# Patient Record
Sex: MALE | Race: BLACK OR AFRICAN AMERICAN
[De-identification: names, ages, dates, MRNs, and addresses within clinical notes are randomized per-mention and may not be internally consistent; named-entity substitution may affect disease eponyms.]

---

## 2018-11-29 ENCOUNTER — HOSPITAL ENCOUNTER (INPATIENT)
Dept: HOSPITAL 72 - EMR | Age: 68
LOS: 2 days | Discharge: HOME | DRG: 145 | End: 2018-12-01
Payer: MEDICARE

## 2018-11-29 VITALS — DIASTOLIC BLOOD PRESSURE: 76 MMHG | SYSTOLIC BLOOD PRESSURE: 128 MMHG

## 2018-11-29 VITALS — SYSTOLIC BLOOD PRESSURE: 123 MMHG | DIASTOLIC BLOOD PRESSURE: 61 MMHG

## 2018-11-29 VITALS — DIASTOLIC BLOOD PRESSURE: 65 MMHG | SYSTOLIC BLOOD PRESSURE: 113 MMHG

## 2018-11-29 VITALS — HEIGHT: 73 IN | WEIGHT: 211 LBS | BODY MASS INDEX: 27.96 KG/M2

## 2018-11-29 VITALS — SYSTOLIC BLOOD PRESSURE: 114 MMHG | DIASTOLIC BLOOD PRESSURE: 69 MMHG

## 2018-11-29 VITALS — DIASTOLIC BLOOD PRESSURE: 61 MMHG | SYSTOLIC BLOOD PRESSURE: 128 MMHG

## 2018-11-29 VITALS — DIASTOLIC BLOOD PRESSURE: 57 MMHG | SYSTOLIC BLOOD PRESSURE: 124 MMHG

## 2018-11-29 DIAGNOSIS — E78.2: ICD-10-CM

## 2018-11-29 DIAGNOSIS — Z95.0: ICD-10-CM

## 2018-11-29 DIAGNOSIS — J20.9: ICD-10-CM

## 2018-11-29 DIAGNOSIS — I13.0: ICD-10-CM

## 2018-11-29 DIAGNOSIS — K21.9: ICD-10-CM

## 2018-11-29 DIAGNOSIS — N40.0: ICD-10-CM

## 2018-11-29 DIAGNOSIS — J20.8: Primary | ICD-10-CM

## 2018-11-29 DIAGNOSIS — Z88.8: ICD-10-CM

## 2018-11-29 DIAGNOSIS — I50.31: ICD-10-CM

## 2018-11-29 DIAGNOSIS — Z85.830: ICD-10-CM

## 2018-11-29 DIAGNOSIS — Z88.2: ICD-10-CM

## 2018-11-29 DIAGNOSIS — Z86.718: ICD-10-CM

## 2018-11-29 DIAGNOSIS — Z91.041: ICD-10-CM

## 2018-11-29 DIAGNOSIS — E27.1: ICD-10-CM

## 2018-11-29 DIAGNOSIS — E89.6: ICD-10-CM

## 2018-11-29 DIAGNOSIS — J45.909: ICD-10-CM

## 2018-11-29 DIAGNOSIS — B97.29: ICD-10-CM

## 2018-11-29 DIAGNOSIS — R31.9: ICD-10-CM

## 2018-11-29 DIAGNOSIS — N18.3: ICD-10-CM

## 2018-11-29 LAB
ADD MANUAL DIFF: NO
ALBUMIN SERPL-MCNC: 3.4 G/DL (ref 3.4–5)
ALBUMIN/GLOB SERPL: 1 {RATIO} (ref 1–2.7)
ALP SERPL-CCNC: 129 U/L (ref 46–116)
ALT SERPL-CCNC: 29 U/L (ref 12–78)
ANION GAP SERPL CALC-SCNC: 7 MMOL/L (ref 5–15)
APPEARANCE UR: CLEAR
APTT PPP: YELLOW S
AST SERPL-CCNC: 17 U/L (ref 15–37)
BASOPHILS NFR BLD AUTO: 1 % (ref 0–2)
BILIRUB SERPL-MCNC: 0.5 MG/DL (ref 0.2–1)
BUN SERPL-MCNC: 19 MG/DL (ref 7–18)
CALCIUM SERPL-MCNC: 9.1 MG/DL (ref 8.5–10.1)
CHLORIDE SERPL-SCNC: 109 MMOL/L (ref 98–107)
CHOLEST SERPL-MCNC: 217 MG/DL (ref ?–200)
CK MB SERPL-MCNC: 0.9 NG/ML (ref 0–3.6)
CK SERPL-CCNC: 105 U/L (ref 26–308)
CO2 SERPL-SCNC: 29 MMOL/L (ref 21–32)
CREAT SERPL-MCNC: 1.4 MG/DL (ref 0.55–1.3)
EOSINOPHIL NFR BLD AUTO: 4 % (ref 0–3)
ERYTHROCYTE [DISTWIDTH] IN BLOOD BY AUTOMATED COUNT: 10.6 % (ref 11.6–14.8)
GLOBULIN SER-MCNC: 3.4 G/DL
GLUCOSE UR STRIP-MCNC: NEGATIVE MG/DL
HCT VFR BLD CALC: 38.8 % (ref 42–52)
HDLC SERPL-MCNC: 38 MG/DL (ref 40–60)
HGB BLD-MCNC: 13.5 G/DL (ref 14.2–18)
KETONES UR QL STRIP: NEGATIVE
LEUKOCYTE ESTERASE UR QL STRIP: NEGATIVE
LYMPHOCYTES NFR BLD AUTO: 20.4 % (ref 20–45)
MCV RBC AUTO: 93 FL (ref 80–99)
MONOCYTES NFR BLD AUTO: 9 % (ref 1–10)
NEUTROPHILS NFR BLD AUTO: 65.5 % (ref 45–75)
NITRITE UR QL STRIP: NEGATIVE
PH UR STRIP: 5 [PH] (ref 4.5–8)
PLATELET # BLD: 165 K/UL (ref 150–450)
POTASSIUM SERPL-SCNC: 4.4 MMOL/L (ref 3.5–5.1)
PROT UR QL STRIP: NEGATIVE
RBC # BLD AUTO: 4.18 M/UL (ref 4.7–6.1)
SODIUM SERPL-SCNC: 145 MMOL/L (ref 136–145)
SP GR UR STRIP: 1.02 (ref 1–1.03)
TRIGL SERPL-MCNC: 157 MG/DL (ref 30–150)
UROBILINOGEN UR-MCNC: NORMAL MG/DL (ref 0–1)
WBC # BLD AUTO: 5.3 K/UL (ref 4.8–10.8)

## 2018-11-29 PROCEDURE — 82550 ASSAY OF CK (CPK): CPT

## 2018-11-29 PROCEDURE — 83880 ASSAY OF NATRIURETIC PEPTIDE: CPT

## 2018-11-29 PROCEDURE — 85025 COMPLETE CBC W/AUTO DIFF WBC: CPT

## 2018-11-29 PROCEDURE — 36415 COLL VENOUS BLD VENIPUNCTURE: CPT

## 2018-11-29 PROCEDURE — 87040 BLOOD CULTURE FOR BACTERIA: CPT

## 2018-11-29 PROCEDURE — 86710 INFLUENZA VIRUS ANTIBODY: CPT

## 2018-11-29 PROCEDURE — 84484 ASSAY OF TROPONIN QUANT: CPT

## 2018-11-29 PROCEDURE — 94664 DEMO&/EVAL PT USE INHALER: CPT

## 2018-11-29 PROCEDURE — 82553 CREATINE MB FRACTION: CPT

## 2018-11-29 PROCEDURE — 80061 LIPID PANEL: CPT

## 2018-11-29 PROCEDURE — 93005 ELECTROCARDIOGRAM TRACING: CPT

## 2018-11-29 PROCEDURE — 99285 EMERGENCY DEPT VISIT HI MDM: CPT

## 2018-11-29 PROCEDURE — 94640 AIRWAY INHALATION TREATMENT: CPT

## 2018-11-29 PROCEDURE — 83690 ASSAY OF LIPASE: CPT

## 2018-11-29 PROCEDURE — 96375 TX/PRO/DX INJ NEW DRUG ADDON: CPT

## 2018-11-29 PROCEDURE — 96376 TX/PRO/DX INJ SAME DRUG ADON: CPT

## 2018-11-29 PROCEDURE — 83605 ASSAY OF LACTIC ACID: CPT

## 2018-11-29 PROCEDURE — 71045 X-RAY EXAM CHEST 1 VIEW: CPT

## 2018-11-29 PROCEDURE — 80053 COMPREHEN METABOLIC PANEL: CPT

## 2018-11-29 PROCEDURE — 81003 URINALYSIS AUTO W/O SCOPE: CPT

## 2018-11-29 PROCEDURE — 84443 ASSAY THYROID STIM HORMONE: CPT

## 2018-11-29 PROCEDURE — 96365 THER/PROPH/DIAG IV INF INIT: CPT

## 2018-11-29 PROCEDURE — 93970 EXTREMITY STUDY: CPT

## 2018-11-29 RX ADMIN — SODIUM CHLORIDE SCH MLS/HR: 0.9 INJECTION INTRAVENOUS at 20:15

## 2018-11-29 RX ADMIN — AZITHROMYCIN DIHYDRATE SCH MLS/HR: 500 INJECTION, POWDER, LYOPHILIZED, FOR SOLUTION INTRAVENOUS at 21:10

## 2018-11-29 NOTE — DIAGNOSTIC IMAGING REPORT
Indication: Shortness of breath

 

Technique: One view of the chest

 

Comparison: none

 

Findings: There is some atelectasis at the left lung base. Lungs and pleural spaces

are otherwise clear. Heart size is normal. There is a left chest bifocal pacemaker.

 

Impression: Left basilar atelectasis. No acute process otherwise

## 2018-11-29 NOTE — EMERGENCY ROOM REPORT
History of Present Illness


General


Chief Complaint:  Dyspnea/Respdistress


Source:  Patient





Present Illness


HPI


Patient presents with complaints of cough and shortness of breath he reports 

that symptoms have been ongoing for the past 3-4 days


He has been on oral antibiotics he was seen by his urologist yesterday also his 

pulmonologist for the past few days





Patient reports he has a cardiologist as well


Denies any chest pain however he feels more short of breath laying flat


Increased cough and congestion


Denies any dysuria or frequency denies any neck pain or photophobia


Allergies:  


Coded Allergies:  


     HEPARIN (Verified  Allergy, Unknown, 11/29/18)


Uncoded Allergies:  


     CHICKEN (Allergy, Unknown, 11/29/18)





Patient History


Past Medical History:  see triage record


Pertinent Family History:  none


Reviewed Nursing Documentation:  PMH: Agreed; PSxH: Agreed





Nursing Documentation-PMH


Past Medical History:  No History, Except For


Hx Cardiac Problems:  Yes


Hx Pacemaker:  Yes


Hx Asthma:  Yes - Pneumonia


Hx COPD:  No


Hx Diabetes:  No


Hx Cancer:  No


Hx Gastrointestinal Problems:  No


Hx Dialysis:  No


History Of Psychiatric Problem:  No


Hx Neurological Problems:  No


Hx Cerebrovascular Accident:  No


Hx Seizures:  No





Review of Systems


All Other Systems:  negative except mentioned in HPI





Physical Exam





Vital Signs








  Date Time  Temp Pulse Resp B/P (MAP) Pulse Ox O2 Delivery O2 Flow Rate FiO2


 


11/29/18 11:21 98.1 98 14 157/77 97 Room Air  








Sp02 EP Interpretation:  reviewed, normal


General Appearance:  mild distress - Appears short of breath


Head:  normocephalic, atraumatic


Eyes:  bilateral eye PERRL, bilateral eye EOMI


ENT:  hearing grossly normal, normal pharynx, TMs + canals normal, uvula midline


Neck:  full range of motion, supple, no meningismus, no bony tend


Respiratory:  no respiratory distress, no retraction, no accessory muscle use, 

crackles - Both lower lobes


Cardiovascular #1:  normal peripheral pulses, regular rate, rhythm, no gallop, 

no JVD, no murmur


Gastrointestinal:  normal bowel sounds, non tender, soft, no mass, no 

organomegaly, non-distended, no guarding, no hernia, no pulsatile mass, no 

rebound


Genitourinary:  no CVA tenderness


Musculoskeletal:  normal inspection


Neurologic:  oriented x3, responsive, CNs III-XII nml as tested, motor strength/

tone normal, sensory intact


Psychiatric:  mood/affect normal


Skin:  other - Pitting edema bilaterally


Lymphatic:  normal inspection, no adenopathy





Medical Decision Making


Diagnostic Impression:  


 Primary Impression:  


 Dyspnea


ER Course


Patient is a fairly complex patient with multiple differential to consideration 

including but not limited to cardiac cardiopulmonary and vascular emergencies





Patient's blood work is fairly benign medical imaging does not show any obvious 

acute pathology





Patient has done better with diuretics and breathing treatment


Remained short of breath and dyspneic further inpatient interrogation 

investigation will be made





Consideration for pulmonary embolism is made patient has had fairly extensive 

recent workup


And this is defer to patient pulmonology





Labs








Test


  11/29/18


12:23


 


White Blood Count


  5.3 K/UL


(4.8-10.8)


 


Red Blood Count


  4.18 M/UL


(4.70-6.10)


 


Hemoglobin


  13.5 G/DL


(14.2-18.0)


 


Hematocrit


  38.8 %


(42.0-52.0)


 


Mean Corpuscular Volume 93 FL (80-99) 


 


Mean Corpuscular Hemoglobin


  32.2 PG


(27.0-31.0)


 


Mean Corpuscular Hemoglobin


Concent 34.7 G/DL


(32.0-36.0)


 


Red Cell Distribution Width


  10.6 %


(11.6-14.8)


 


Platelet Count


  165 K/UL


(150-450)


 


Mean Platelet Volume


  6.4 FL


(6.5-10.1)


 


Neutrophils (%) (Auto)


  65.5 %


(45.0-75.0)


 


Lymphocytes (%) (Auto)


  20.4 %


(20.0-45.0)


 


Monocytes (%) (Auto)


  9.0 %


(1.0-10.0)


 


Eosinophils (%) (Auto)


  4.0 %


(0.0-3.0)


 


Basophils (%) (Auto)


  1.0 %


(0.0-2.0)


 


Urine Color Yellow 


 


Urine Appearance Clear 


 


Urine pH 5 (4.5-8.0) 


 


Urine Specific Gravity


  1.025


(1.005-1.035)


 


Urine Protein


  Negative


(NEGATIVE)


 


Urine Glucose (UA)


  Negative


(NEGATIVE)


 


Urine Ketones


  Negative


(NEGATIVE)


 


Urine Blood 1+ (NEGATIVE) 


 


Urine Nitrite


  Negative


(NEGATIVE)


 


Urine Bilirubin


  Negative


(NEGATIVE)


 


Urine Urobilinogen


  Normal MG/DL


(0.0-1.0)


 


Urine Leukocyte Esterase


  Negative


(NEGATIVE)


 


Urine RBC


  0-2 /HPF (0 -


0)


 


Urine WBC 0 /HPF (0 - 0) 


 


Urine Squamous Epithelial


Cells None /LPF


(NONE/OCC)


 


Urine Bacteria


  None /HPF


(NONE)


 


Sodium Level


  145 MMOL/L


(136-145)


 


Potassium Level


  4.4 MMOL/L


(3.5-5.1)


 


Chloride Level


  109 MMOL/L


()


 


Carbon Dioxide Level


  29 MMOL/L


(21-32)


 


Anion Gap


  7 mmol/L


(5-15)


 


Blood Urea Nitrogen


  19 mg/dL


(7-18)


 


Creatinine


  1.4 MG/DL


(0.55-1.30)


 


Estimat Glomerular Filtration


Rate > 60 mL/min


(>60)


 


Glucose Level


  103 MG/DL


()


 


Lactic Acid Level


  1.90 mmol/L


(0.4-2.0)


 


Calcium Level


  9.1 MG/DL


(8.5-10.1)


 


Total Bilirubin


  0.5 MG/DL


(0.2-1.0)


 


Aspartate Amino Transf


(AST/SGOT) 17 U/L (15-37) 


 


 


Alanine Aminotransferase


(ALT/SGPT) 29 U/L (12-78) 


 


 


Alkaline Phosphatase


  129 U/L


()


 


Total Creatine Kinase


  105 U/L


()


 


Creatine Kinase MB


  0.9 NG/ML


(0.0-3.6)


 


Creatine Kinase MB Relative


Index 0.8 


 


 


Troponin I


  0.000 ng/mL


(0.000-0.056)


 


Pro-B-Type Natriuretic Peptide


  169 pg/mL


(0-125)


 


Total Protein


  6.8 G/DL


(6.4-8.2)


 


Albumin


  3.4 G/DL


(3.4-5.0)


 


Globulin 3.4 g/dL 


 


Albumin/Globulin Ratio 1.0 (1.0-2.7) 


 


Lipase


  81 U/L


()








Rhythm Strip Diag. Results


EP Interpretation:  yes


Rate:  77


Rhythm:  NSR, no PVC's, no ectopy





Chest X-Ray Diagnostic Results


Chest X-Ray Diagnostic Results :  


   Chest X-Ray Ordered:  Yes


   # of Views/Limited/Complete:  1 View


   Indication:  Chest Pain


   EP Interpretation:  Yes


   Interpretation:  no consolidation, no effusion, no pneumothorax


   Impression:  No acute disease


   Electronically Signed by:  Eneida Yepez DO





Last Vital Signs








  Date Time  Temp Pulse Resp B/P (MAP) Pulse Ox O2 Delivery O2 Flow Rate FiO2


 


11/29/18 11:21 98.1 98 14 157/77 97 Room Air  








Status:  improved


Disposition:  ADMITTED AS INPATIENT


Condition:  Serious











Eneida Yepez DO Nov 29, 2018 11:44

## 2018-11-29 NOTE — HISTORY AND PHYSICAL REPORT
DATE OF ADMISSION:  11/29/2018

HISTORY OF PRESENT ILLNESS:  The patient is a very pleasant 68-year-old

gentleman, who presented to the emergency room here with complaints of

chest congestion, rhinorrhea, and temperature to 102 at home with chills.

No nausea or vomiting.  Occasional cough.  The patient was initially seen

as an outpatient by Dr. Salomon, who started him on doxycycline for

bronchitis and his chest x-ray was stable, but since then, he had

increased sensitivity including his congestion, cough, wheezing, shortness

of breath, and thus decided to come to the emergency room.  He was

initially gone through [facility] emergency room, but left AMA three times and

presented now to [place] for further evaluation and treatment.  The

patient has not followed up with me in the office recently, has been

apparently noncompliant with any of his medications or his diet.



PAST MEDICAL HISTORY:  Includes history of cough, history of neurogenic

syncope, history of cardiac pacemaker, history of hypertension, history of

chronic low back pain, history of prior DVT, status post six months of

anticoagulation, history of chest pain with normal workup in the past,

history of orthostatic hypotension, history of Larue's disease, history

of spinal stenosis, status post surgery, history of pseudogout, history of

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

the past, status post adrenalectomy in 2004, history of hernia repair,

history of reactive airway disease, history of bronchitis in 2015 after

human metapneumovirus infection.  The patient has had recurrent bouts of

bronchitis since then, history of knee cancer in 1960, status post chemo,

history of hyperlipidemia, history of RSV pneumonitis, and history of

bronchospasms.  History of _____ SVT ablation, history of greater

saphenous vein radiofrequency ablation by  _____.



ALLERGIES:  Allergic to heparin analogues, iodine, Bactrim, and

chicken-derived products.



MEDICATIONS:  Please see reconciled medications list.



SOCIAL HISTORY:  Does not smoke.  Does not drink any alcohol.  Does not use

any drugs.  He used to smoke a pipe, quit over 20 years ago.



FAMILY HISTORY:  Noncontributory.



REVIEW OF SYSTEMS:  A 12-point review of systems reviewed.  Negative except

for above.  He denies any chest pain.  He admits to increased lower

extremity swelling recently.



PHYSICAL EXAMINATION:

GENERAL:  He is a well developed, well nourished, currently in no apparent

distress.

VITAL SIGNS:  Blood pressure is 128/61, pulse 78, respirations 14,

temperature 98.1, and saturations 97% room air.

HEENT:  Head is normocephalic and atraumatic.  Pupils are equal and

reactive to light.  Extraocular muscles are intact.  Eyes are anicteric.

 

NECK:  Supple.  No JVP.  No bruits.

LUNGS:  He does have end-expiratory wheezes and coarse breath sounds.

HEART:  Regular rate and rhythm.

ABDOMEN:  Soft.  Positive bowel sounds.  Nondistended.  Nontender.

 

EXTREMITIES:  No clubbing or cyanosis.  Bilateral edema, right greater than

left about 2+.



LABORATORY AND DIAGNOSTIC DATA:  Labs revealed a white count of 5.3,

hemoglobin 13.5, hematocrit 38.8, platelet count 165.  Sodium 145,

potassium 4.4, chloride 109, BUN of 19, creatinine 1.4.  Lactic acid 1.9.

Troponin is 0.  ProBNP is 169.  Chest x-ray, there is left basilar

atelectasis.  No acute process, otherwise.  EKG noted.



ASSESSMENT AND PLAN:  The patient is a 68-year-old gentleman, presents with

shortness of breath, congestion, wheezing despite taking oral doxycycline.

The patient will be admitted and Pulmonary consultation requested from

Dr. Salomon.  The patient will be started on antibiotics, Zithromax and

Rocephin.  He will be given respiratory treatments with DuoNeb.  We will

give him a low-dose steroids for now.  We will monitor his laboratories.

Consider diuretics.  We will also obtain an influenza panel on him and

viral panel.  The patient should be on DVT and ulcer prophylaxis.









  ______________________________________________

  Waldo Sanders M.D. DR:  ELENA

D:  11/29/2018 22:12

T:  11/30/2018 11:51

JOB#:  [id number]

CC:

## 2018-11-29 NOTE — CONSULTATION
Consult Note


Consult Note


PCCM CONSULTATION


11/29/2018





REFERRING PHYSICIAN: Waldo Sanders MD ~





REASON FOR CONSULTATION: SOB





HPI:~Calos Griffithis a 68 year old~malewell known to me with multiple 

medical problems as noted below who saw me in the office 2 days ago with a 6 lb 

weight gain and 2 weeks of congestion, rhinorrhea, not coughing but feels his 

lungs are full, T 102 @ home + chills, no NVDC, no abd pain or urinary 

complaints.  He was AFVSS in my office, CXR was stable, I started him on Doxy 

for bronchitis.  Since then he has had increased cough, congestion, SOB and 

wheezing. No F/C/CP.  He went to [facility] ED and left AMA 3 times, I convinced him 

to come to the ER for further evaluation and treatment. He has a h/o medication 

non-compliance, dietary and diuretic non-compliance as well. 














  


 


Past Medical History:  


 


Diagnosis Date 


 


 Ananth disease (HCC) 


 


 History of cancer 1970


 


 bone cancer in knees - had chemo ; per pt, has been in remission since 


 


 History of deep venous thrombosis 


 


 right leg 


 


 Hypercholesterolemia 


 


 Hypertension 


 


 per pt, low BP 


 


 Pacemaker 


 


 Pneumonia 


 


 Syncope 


 


 no findings for cause 














   


 


Past Surgical History:   


 


Procedure Laterality Date 


 


 HX CVIC-EPS N/A 10/29/2015


 


 Surgeon: Heidi Jimenez MD; ~Location: MAIN CARDIAC CATH LAB  


 


 HX CVIC-SVT ABLATION N/A 10/29/2015


 


 eps/svt; ~Surgeon: Heidi Jimenez MD; ~Location: MAIN CARDIAC CATH LAB  


 


 HX GI-COLONOSCOPY N/A 9/8/2015


 


 Surgeon: Kev Toibas MD; ~Location: MAIN GI LAB  


 


 HX GI-ESOPHAGOGASTRODUODENOSCOPY N/A 9/8/2015


 


 biopsy; ~Surgeon: Kev Tobias MD; ~Location: MAIN GI LAB  


 


 HX LUMBAR LAMINECTOMY  9/2/14


 


 HX LUMBAR LAMINECTOMY N/A 9/2/2014


 


 L3-L5; ~Surgeon: Joe Markham MD; ~Location: MAIN OR  


 


 HX NON-ANESTHESIA FOR IMAGING  N/A 9/24/2015


 


 Surgeon: Jostin Mcfarlane MD; ~Location: MAIN IMAGING  


 


 HX PACEMAKER PLACEMENT Left 07/25/2017


 


 Pacemaker [employer] Model L311 Serial; [employer] Lead [id number]; 

Pacesetter Lead [id number]  


 


 HX VEIN RADIOFREQUENCY ABLATION Right 6/5/2018


 


 GREAT SAPHENOUS VEIN RADIOFREQUENCY ABLATION; ~Surgeon: Eneida Rowland MD; ~

Location: MAIN OR  


 


 [place] LAP,ADRENALECTOMY  May 2004


 


 Dr Nissen~  








Allergies:~~Heparin analogues; Iodine [iodinated contrast- oral and iv dye]; 

Prednisone; Sulfamethoxazole-trimethoprim; Chicken derived; Heparin; and Iodine


MEDS: None








Social History














   


 


Social History   


 


 Marital status: Single 


 


  Spouse name: N/A


 


 Number of children: N/A 


 


 Years of education: N/A 














 


 


Occupational History 


 


 Not on file.














    


 


Social History Main Topics    


 


 Smoking status: Former Smoker  


 


  Start date: 1/1/1972 


 


  Quit date: 1/1/1986 


 


 Smokeless tobacco: Former User  


 


   Comment: Smoked a pipe; quit smoking over 20 years ago 


 


 Alcohol use No  


 


 Drug use: No  


 


 Sexual activity: No  














   


 


Family History   


 


Problem Relation Age of Onset 


 


 No Significant Medical Problems Mother 


 


 No Significant Medical Problems Father 








ROS: Negative other than HPI


PE: 


Vital Sign - Last 24 Hours








 11/29/18 11/29/18 11/29/18





 11:21 12:46 12:46


 


Temp 98.1 98.1 


 


Pulse 98 78 98


 


Resp 14 14 14


 


B/P (MAP) 157/77 128/61 


 


Pulse Ox 97 97 


 


O2 Delivery Room Air Room Air Room Air











NAD, AAOX3


NC/AT, OPC c MMM


Supple s LAD of JVD


Scattered coarse with EEW


RRR 


S/NT/ND c NABS


R > L 2+ HELENA


Assessment/Plan


ASSESSMENT: 





1.   URI/BRONCHITIS ---> bronchospasm


2.   S/P RLE venous ablation for chronic venous insufficiency


3.   Chronic bronchospasm and reactive airway disease 


4.   Prolonged post viral course in the past.


5.   History of recanalized deep vein thrombosis in the right, status post 6 

months of anticoagulation with no evidence of deep vein thrombosis currently. 


6.   Gastroesophageal reflux disease.


7.   Congestive heart failure with diastolic dysfunction ~


8.   Ananth disease.


9.   History of adrenalectomy.


10.   History of neurogenic syncope.


11.   History of pacemaker.


12.   Hypertension.


13.   Hyperlipidemia.


14.   L3 lesion, Schmorl node





PLAN: 





ED evaluation


Labs, CXR


May need some diuretics


Start CAP coverage


Advair 


HHN's


Cautious steroids given h/o AMS/psychossi


Monitor volumes and renal function


Likely admission


DVT PX: SCD (has Hep allergy)


FC











Darshan Salomon MD Nov 29, 2018 13:55

## 2018-11-30 VITALS — DIASTOLIC BLOOD PRESSURE: 46 MMHG | SYSTOLIC BLOOD PRESSURE: 106 MMHG

## 2018-11-30 VITALS — DIASTOLIC BLOOD PRESSURE: 69 MMHG | SYSTOLIC BLOOD PRESSURE: 109 MMHG

## 2018-11-30 VITALS — SYSTOLIC BLOOD PRESSURE: 110 MMHG | DIASTOLIC BLOOD PRESSURE: 70 MMHG

## 2018-11-30 VITALS — SYSTOLIC BLOOD PRESSURE: 112 MMHG | DIASTOLIC BLOOD PRESSURE: 21 MMHG

## 2018-11-30 VITALS — SYSTOLIC BLOOD PRESSURE: 96 MMHG | DIASTOLIC BLOOD PRESSURE: 57 MMHG

## 2018-11-30 VITALS — DIASTOLIC BLOOD PRESSURE: 64 MMHG | SYSTOLIC BLOOD PRESSURE: 106 MMHG

## 2018-11-30 LAB
ALBUMIN SERPL-MCNC: 3.3 G/DL (ref 3.4–5)
ALBUMIN/GLOB SERPL: 0.9 {RATIO} (ref 1–2.7)
ALP SERPL-CCNC: 131 U/L (ref 46–116)
ALT SERPL-CCNC: 25 U/L (ref 12–78)
ANION GAP SERPL CALC-SCNC: 8 MMOL/L (ref 5–15)
AST SERPL-CCNC: 26 U/L (ref 15–37)
BILIRUB SERPL-MCNC: 0.6 MG/DL (ref 0.2–1)
BUN SERPL-MCNC: 31 MG/DL (ref 7–18)
CALCIUM SERPL-MCNC: 9 MG/DL (ref 8.5–10.1)
CHLORIDE SERPL-SCNC: 106 MMOL/L (ref 98–107)
CO2 SERPL-SCNC: 26 MMOL/L (ref 21–32)
CREAT SERPL-MCNC: 1.5 MG/DL (ref 0.55–1.3)
GLOBULIN SER-MCNC: 3.7 G/DL
POTASSIUM SERPL-SCNC: 4.8 MMOL/L (ref 3.5–5.1)
SODIUM SERPL-SCNC: 139 MMOL/L (ref 136–145)

## 2018-11-30 RX ADMIN — SODIUM CHLORIDE SCH MLS/HR: 0.9 INJECTION INTRAVENOUS at 19:30

## 2018-11-30 RX ADMIN — FLUTICASONE PROPIONATE AND SALMETEROL SCH PUFFS: 50; 250 POWDER RESPIRATORY (INHALATION) at 19:48

## 2018-11-30 RX ADMIN — AZITHROMYCIN DIHYDRATE SCH MLS/HR: 500 INJECTION, POWDER, LYOPHILIZED, FOR SOLUTION INTRAVENOUS at 20:00

## 2018-11-30 RX ADMIN — IPRATROPIUM BROMIDE AND ALBUTEROL SULFATE SCH ML: .5; 3 SOLUTION RESPIRATORY (INHALATION) at 23:41

## 2018-11-30 RX ADMIN — IPRATROPIUM BROMIDE AND ALBUTEROL SULFATE SCH ML: .5; 3 SOLUTION RESPIRATORY (INHALATION) at 19:48

## 2018-11-30 RX ADMIN — GUAIFENESIN SCH MG: 600 TABLET, EXTENDED RELEASE ORAL at 17:11

## 2018-11-30 NOTE — DIAGNOSTIC IMAGING REPORT
--------------- APPROVED REPORT --------------





CPT Code: 55405



Present Symptoms

Comments: BILATERAL LEGS PAIN.





BILATERAL: Imaging reveals a patent deep venous system bilaterally. There is no evidence 

of thrombus within the femoral, popliteal or tibial segments. The greater saphenous veins 

are also within normal limits. Doppler indicates normal spontaneous flow within these 

segments.

## 2018-11-30 NOTE — GENERAL PROGRESS NOTE
Assessment/Plan


Assessment/Plan


dyspnea


bronchitis


LE edema 








abx


check cultures


check viral panels


pulmonary hygine


le edema improved


check dopplers


dvt and ulcer prophylaxis





Subjective


Neurologic/Psychiatric:  Reports: paresthesia


Allergies:  


Coded Allergies:  


     HEPARIN (Verified  Allergy, Unknown, 11/29/18)


Uncoded Allergies:  


     CHICKEN (Allergy, Unknown, 11/29/18)


Subjective


feels better decreased sob decreased cough no cp





Objective





Last 24 Hour Vital Signs








  Date Time  Temp Pulse Resp B/P (MAP) Pulse Ox O2 Delivery O2 Flow Rate FiO2


 


11/30/18 19:50      Room Air  21


 


11/30/18 19:50        21


 


11/30/18 19:50      Room Air  21


 


11/30/18 19:50        21


 


11/30/18 19:50  77 16   Room Air  21


 


11/30/18 16:29 98.0 73 20 106/64 (78) 95   





  73      


 


11/30/18 16:00  71      


 


11/30/18 12:00  70      


 


11/30/18 12:00 98.3 82 20 112/21 (51) 99   





  82      


 


11/30/18 09:00      Room Air  


 


11/30/18 08:20  82 18   Room Air  21


 


11/30/18 08:00  79      


 


11/30/18 08:00 98.4 76 20 96/57 (70) 96   





  79      


 


11/30/18 04:00  70      


 


11/30/18 04:00 98.0 79 19 109/69 (82) 97   





  79      


 


11/30/18 00:00 97.9 76 18 110/70 (83) 97   





  76      


 


11/30/18 00:00  74      

















Intake and Output  


 


 11/29/18 11/30/18





 19:00 07:00


 


Intake Total 0 ml 


 


Balance 0 ml 


 


  


 


Intake Oral 0 ml 


 


Other 0 ml 








Laboratory Tests


11/30/18 07:00: 


Sodium Level 139, Potassium Level 4.8, Chloride Level 106, Carbon Dioxide Level 

26, Anion Gap 8, Blood Urea Nitrogen 31H, Creatinine 1.5H, Estimat Glomerular 

Filtration Rate 56.1, Glucose Level 122H, Calcium Level 9.0, Total Bilirubin 0.6

, Aspartate Amino Transf (AST/SGOT) 26, Alanine Aminotransferase (ALT/SGPT) 25, 

Alkaline Phosphatase 131H, Pro-B-Type Natriuretic Peptide 110, Total Protein 7.0

, Albumin 3.3L, Globulin 3.7, Albumin/Globulin Ratio 0.9L


Height (Feet):  6


Height (Inches):  1.00


Weight (Pounds):  211


General Appearance:  WD/WN, no apparent distress


Neck:  supple


Cardiovascular:  regular rhythm


Respiratory/Chest:  lungs clear


Abdomen:  soft











Waldo Sanders MD Nov 30, 2018 23:10

## 2018-11-30 NOTE — PULMONOLOGY PROGRESS NOTE
Assessment/Plan


Problems:  


(1) Reactive airway disease


(2) Bronchospasm with bronchitis, acute


(3) Coronavirus infection


Assessment/Plan


ASSESSMENT: 





1.   Coronavirus URI/BRONCHITIS ---> bronchospasm


2.   S/P RLE venous ablation for chronic venous insufficiency


3.   Chronic bronchospasm and reactive airway disease 


4.   Prolonged post viral course in the past.


5.   History of recanalized deep vein thrombosis in the right, status post 6 

months of anticoagulation with no evidence of deep vein thrombosis currently. 


6.   Gastroesophageal reflux disease.


7.   Congestive heart failure with diastolic dysfunction ~


8.   Naturita disease.


9.   History of adrenalectomy.


10.   History of neurogenic syncope.


11.   History of pacemaker.


12.   Hypertension.


13.   Hyperlipidemia.


14.   L3 lesion, Schmorl node





PLAN: 





Optimize pulmonary hygiene/mobilize as tolerated


PRN O2


Advair 250/50 (Auto sub for Symbicort)


RTC and PRN HHN's


Hold off steroids given clinical improvement


CTx/Azithro (D2), F/U CX's


Monitor volumes and renal function, PRN Lasix


DVT PX: SCD (has Hep allergy)


FC





Subjective


Allergies:  


Coded Allergies:  


     HEPARIN (Verified  Allergy, Unknown, 11/29/18)


Uncoded Allergies:  


     CHICKEN (Allergy, Unknown, 11/29/18)


Subjective


AFVSS, stable on RA, VRPM from my office --> CORONAVIRUS


Less SOB, less cough, less wheezing, edema is better, no F/C, no CP


Feels ~ 80% improved





Objective





Last 24 Hour Vital Signs








  Date Time  Temp Pulse Resp B/P (MAP) Pulse Ox O2 Delivery O2 Flow Rate FiO2


 


11/30/18 12:00  70      


 


11/30/18 12:00 98.3 82 20 112/21 (51) 99   





  82      


 


11/30/18 09:00      Room Air  


 


11/30/18 08:20  82 18   Room Air  21


 


11/30/18 08:00  79      


 


11/30/18 08:00 98.4 76 20 96/57 (70) 96   





  79      


 


11/30/18 04:00  70      


 


11/30/18 04:00 98.0 79 19 109/69 (82) 97   





  79      


 


11/30/18 00:00 97.9 76 18 110/70 (83) 97   





  76      


 


11/30/18 00:00  74      


 


11/29/18 22:15  79 20   Room Air  21


 


11/29/18 21:00      Room Air  


 


11/29/18 20:00 98.4 78 18 114/69 (84) 97   





  78      


 


11/29/18 20:00  75      


 


11/29/18 18:00 97.3 65 21 113/65 (81) 96   





  65      


 


11/29/18 16:17 98.1 78 16 123/61 99 Room Air  





  78      

















Intake and Output  


 


 11/29/18 11/30/18





 18:59 06:59


 


Intake Total 0 ml 


 


Balance 0 ml 


 


  


 


Intake Oral 0 ml 


 


Other 0 ml 








General Appearance:  WD/WN, no acute distress


HEENT:  normocephalic, atraumatic, anicteric, mucous membranes moist


Respiratory/Chest:  chest wall non-tender, expiratory wheezing


Cardiovascular:  normal peripheral pulses, normal rate, regular rhythm


Abdomen:  normal bowel sounds, soft, non tender, no organomegaly, non distended

, no mass


Extremities:  no cyanosis, no clubbing, no edema





Microbiology








 Date/Time


Source Procedure


Growth Status


 


 


 11/29/18 13:35


Nasal Nares Influenza Types A,B Antigen (DEE) - Final Complete








Laboratory Tests


11/30/18 07:00: 


Sodium Level 139, Potassium Level 4.8, Chloride Level 106, Carbon Dioxide Level 

26, Anion Gap 8, Blood Urea Nitrogen 31H, Creatinine 1.5H, Estimat Glomerular 

Filtration Rate 56.1, Glucose Level 122H, Calcium Level 9.0, Total Bilirubin 0.6

, Aspartate Amino Transf (AST/SGOT) 26, Alanine Aminotransferase (ALT/SGPT) 25, 

Alkaline Phosphatase 131H, Pro-B-Type Natriuretic Peptide 110, Total Protein 7.0

, Albumin 3.3L, Globulin 3.7, Albumin/Globulin Ratio 0.9L





Current Medications








 Medications


  (Trade)  Dose


 Ordered  Sig/Vandana


 Route


 PRN Reason  Start Time


 Stop Time Status Last Admin


Dose Admin


 


 Acetaminophen


  (Tylenol)  650 mg  Q6H  PRN


 ORAL


 Mild Pain/Temp > 100.5  11/29/18 18:00


 12/29/18 17:59   


 


 


 Albuterol/


 Ipratropium


  (Albuterol/


 Ipratropium)  3 ml  Q4H  PRN


 HHN


 Shortness of Breath  11/29/18 18:00


 12/4/18 17:59   


 


 


 Azithromycin 500


 mg/Dextrose  275 ml @ 


 275 mls/hr  Q24HRS


 IV


   11/29/18 20:00


 12/5/18 20:59  11/29/18 21:10


 


 


 Ceftriaxone


 Sodium 1 gm/


 Dextrose  55 ml @ 


 110 mls/hr  Q24H


 IVPB


   11/29/18 19:30


 12/6/18 19:29  11/29/18 20:15


 


 


 Guaifenesin/


 Codeine Phosphate


  (Robitussin with


 codeine)  5 ml  Q6H  PRN


 ORAL


 For Cough  11/29/18 18:00


 12/29/18 17:59   


 

















Darshan Salomon MD Nov 30, 2018 15:54

## 2018-11-30 NOTE — CONSULTATION
DATE OF CONSULTATION:  11/30/2018

NEPHROLOGY CONSULTATION



CONSULTING PHYSICIAN:  Thom Lemus M.D.



REFERRING PHYSICIAN:  Darshan Salomon M.D.



REASON FOR CONSULTATION:  Increasing shortness of breath.



HISTORY OF PRESENT ILLNESS:  This is a very pleasant 68-year-old male who

has some underlying diastolic cardiac dysfunction and also some underlying

CKD stage 3.  He has been having intermittent episodes of shortness of

breath, which is alleviated by loop diuretics.  He is supposed to take

Bumex when his leg is swelling up and he is getting short of breath.

Apparently, he has seen Dr. Salomon in the office about a couple of days

ago and he gained about 8 pounds.  He denied any symptoms at that time and

he did not take his Bumex, however, he became more and more short of

breath and presented to the emergency room of [facility].  In

the emergency room, he was given 40 mg of IV Lasix and seems like he

urinated pretty well and he is feeling much, much better now.  His serum

creatinine being in the range of 1.3, has gone up to about 1.5 mg/dL.



PAST MEDICAL HISTORY:  Significant for recurrent lower extremity edema,

which is probably a combination of CKD stage 3 and diastolic heart

failure; noncompliance with salt; history of metapneumovirus with

recurrent bronchospasm in the past, which has resolved; history of lumbar

stenosis; history of deep venous thrombosis; history of [place] disease in

the past; history of pheochromocytoma, status post adenoidectomy in 2004;

status post pacemaker implantation in 2003 for sick sinus syndrome;

history of bone cancer 45 years ago, status post radiation; previous

recurrent neurogenic syncope; previous pneumonia; also has had kidney

stone with obstructive uropathy and urinary tract infection; Baker cyst in

the popliteal fossa; history of abdominal hernia; hyperlipidemia; history

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

unknown significance; and history of fatty liver.



PAST SURGICAL HISTORY:  Status post unilateral adenoidectomy.  He is status

post recent surgery for varicose veins of right lower extremity.



SOCIAL HISTORY:  Does not smoke and does not drink alcohol abusively.  He

is not  and does not have any children at this point.



FAMILY HISTORY:  Noncontributory.



MEDICATIONS:  Includes albuterol one to two puffs q.4 h., Symbicort two

puffs b.i.d., Bumex 1 mg p.o. every morning as needed, Proscar 5 mg p.o.

daily, Myrbetriq 50 mg p.o. daily, Protonix 40 mg p.o. daily, Pravachol 80

mg p.o. nightly, and Flomax 0.4 mg p.o. daily.



ALLERGIES:  Heparin analogues, which causes hives; oral contrast, which

causes swelling; sulfa causes hives, chicken _____ causes rash; heparin

causes itching, and iodine causes rash.



REVIEW OF SYSTEMS:  GENERAL:  He is feeling much much better now.  He has

had some lack of appetite.  CARDIOVASCULAR:  Denies any chest pain.  He

did not have any orthopnea.  No increasing leg edema.  RESPIRATORY:  He

has been having some cough and some wheezing apparently.

GASTROINTESTINAL:  Denies any nausea, vomiting, diarrhea, melena, or

hematochezia.  SKIN:  Denies any rash or photosensitivity.

MUSCULOSKELETAL:  Denies any arthralgia or myalgia.  ENDOCRINOLOGY:  No

history of diabetes mellitus.  URINARY:  Denies any urinary foaminess,

hematuria, or dysuria.  HEMATOLOGICAL:  Denies any easy bruising or easy

bleeding.  NEUROLOGIC:  Denies any paresthesia, muscle weakness, diplopia,

or seizure.



PHYSICAL EXAMINATION:

GENERAL:  Moderately obese gentleman, who seemed to be in much acute

distress, lying down in bed.

VITAL SIGNS:  Blood pressure is 112/50, pulse of 82, respirations 20, and

temperature 98.3.

HEENT:  Head is atraumatic.  Eyes, pupils reactive to light.  No evidence

of papilledema.  Ears, canals are clear.  Tympanic membranes are intact.

Nose, nares are patent without any nasal discharge.  Throat without

inflammation or exudate.

NECK:  Supple.  Jugular venous distention is within normal limits.  No

cervical adenopathy.  No thyromegaly.

HEART:  Regular rhythm.  No gallop.

LUNGS:  There are few expiratory wheezes bilaterally.

ABDOMEN:  Supple.  Bowel sounds positive.  No hepatosplenomegaly.

EXTREMITIES:  Lower extremity shows no significant pedal edema.

NEUROLOGICAL:  Cranial nerves are intact.  There is no focal neurological

deficit present.



LABORATORY DATA:  Sodium 139, potassium 4.8, chloride is 106, carbon

dioxide 26, BUN is 31, and creatinine is 1.5.  WBC 5.3, hemoglobin 13.5,

hematocrit 38.8, and platelets of 165,000.



IMPRESSION:

1. He does not seem to be in heart failure at this point.

2. I am not convinced that he is in heart failure clinically.

3. He might have some asthmatic bronchitis due to a viral illness

possibly.

4. Obesity.

5. Previous renal colic with calcium oxalate stone.



PLAN:  I would not recommend to continue with the diuretic at this point.

Continue with the current measures.









  ______________________________________________

  Thom Lemus M.D.





DR:  RIVERA

D:  11/30/2018 15:26

T:  11/30/2018 21:32

JOB#:  [id number]

CC:

## 2018-11-30 NOTE — CARDIOLOGY PROGRESS NOTE
Assessment/Plan


Assessment/Plan


[id number]





Objective





Last 24 Hour Vital Signs








  Date Time  Temp Pulse Resp B/P (MAP) Pulse Ox O2 Delivery O2 Flow Rate FiO2


 


11/30/18 16:29 98.0 73 20 106/64 (78) 95   





  73      


 


11/30/18 16:00  71      


 


11/30/18 12:00  70      


 


11/30/18 12:00 98.3 82 20 112/21 (51) 99   





  82      


 


11/30/18 09:00      Room Air  


 


11/30/18 08:20  82 18   Room Air  21


 


11/30/18 08:00  79      


 


11/30/18 08:00 98.4 76 20 96/57 (70) 96   





  79      


 


11/30/18 04:00  70      


 


11/30/18 04:00 98.0 79 19 109/69 (82) 97   





  79      


 


11/30/18 00:00 97.9 76 18 110/70 (83) 97   





  76      


 


11/30/18 00:00  74      


 


11/29/18 22:15  79 20   Room Air  21


 


11/29/18 21:00      Room Air  


 


11/29/18 20:00 98.4 78 18 114/69 (84) 97   





  78      


 


11/29/18 20:00  75      

















Intake and Output  


 


 11/29/18 11/30/18





 18:59 06:59


 


Intake Total 0 ml 


 


Balance 0 ml 


 


  


 


Intake Oral 0 ml 


 


Other 0 ml 











Laboratory Tests








Test


  11/30/18


07:00


 


Sodium Level


  139 MMOL/L


(136-145)


 


Potassium Level


  4.8 MMOL/L


(3.5-5.1)


 


Chloride Level


  106 MMOL/L


()


 


Carbon Dioxide Level


  26 MMOL/L


(21-32)


 


Anion Gap


  8 mmol/L


(5-15)


 


Blood Urea Nitrogen


  31 mg/dL


(7-18)  H


 


Creatinine


  1.5 MG/DL


(0.55-1.30)  H


 


Estimat Glomerular Filtration


Rate 56.1 mL/min


(>60)


 


Glucose Level


  122 MG/DL


()  H


 


Calcium Level


  9.0 MG/DL


(8.5-10.1)


 


Total Bilirubin


  0.6 MG/DL


(0.2-1.0)


 


Aspartate Amino Transf


(AST/SGOT) 26 U/L (15-37)


 


 


Alanine Aminotransferase


(ALT/SGPT) 25 U/L (12-78)


 


 


Alkaline Phosphatase


  131 U/L


()  H


 


Pro-B-Type Natriuretic Peptide


  110 pg/mL


(0-125)


 


Total Protein


  7.0 G/DL


(6.4-8.2)


 


Albumin


  3.3 G/DL


(3.4-5.0)  L


 


Globulin 3.7 g/dL  


 


Albumin/Globulin Ratio


  0.9 (1.0-2.7)


L











Microbiology








 Date/Time


Source Procedure


Growth Status


 


 


 11/29/18 13:35


Nasal Nares Influenza Types A,B Antigen (DEE) - Final Complete

















Yong Jarquin MD Nov 30, 2018 19:44

## 2018-12-01 VITALS — DIASTOLIC BLOOD PRESSURE: 54 MMHG | SYSTOLIC BLOOD PRESSURE: 96 MMHG

## 2018-12-01 VITALS — SYSTOLIC BLOOD PRESSURE: 106 MMHG | DIASTOLIC BLOOD PRESSURE: 65 MMHG

## 2018-12-01 VITALS — SYSTOLIC BLOOD PRESSURE: 124 MMHG | DIASTOLIC BLOOD PRESSURE: 67 MMHG

## 2018-12-01 VITALS — SYSTOLIC BLOOD PRESSURE: 94 MMHG | DIASTOLIC BLOOD PRESSURE: 55 MMHG

## 2018-12-01 RX ADMIN — IPRATROPIUM BROMIDE AND ALBUTEROL SULFATE SCH ML: .5; 3 SOLUTION RESPIRATORY (INHALATION) at 08:05

## 2018-12-01 RX ADMIN — FLUTICASONE PROPIONATE AND SALMETEROL SCH PUFFS: 50; 250 POWDER RESPIRATORY (INHALATION) at 09:01

## 2018-12-01 RX ADMIN — GUAIFENESIN SCH MG: 600 TABLET, EXTENDED RELEASE ORAL at 09:01

## 2018-12-01 NOTE — CARDIOLOGY REPORT
--------------- APPROVED REPORT --------------





EKG Measurement

Heart [id number]

WY 140P57

[id number]

[id number]

[id number]





Normal sinus rhythm

Normal ECG

## 2018-12-01 NOTE — PULMONOLOGY PROGRESS NOTE
Assessment/Plan


Problems:  


(1) Reactive airway disease


(2) Bronchospasm with bronchitis, acute


(3) Coronavirus infection


Assessment/Plan


ASSESSMENT: 





1.   Coronavirus URI/BRONCHITIS ---> bronchospasm


2.   S/P RLE venous ablation for chronic venous insufficiency


3.   Chronic bronchospasm and reactive airway disease 


4.   Prolonged post viral course in the past.


5.   History of recanalized deep vein thrombosis in the right, status post 6 

months of anticoagulation with no evidence of deep vein thrombosis currently. 


6.   Gastroesophageal reflux disease.


7.   Congestive heart failure with diastolic dysfunction ~


8.   [place] disease.


9.   History of adrenalectomy.


10.   History of neurogenic syncope.


11.   History of pacemaker.


12.   Hypertension.


13.   Hyperlipidemia.


14.   L3 lesion, Schmorl node





PLAN: 





Optimize pulmonary hygiene/mobilize as tolerated


PRN O2


Advair 250/50 (Auto sub for Symbicort)


RTC and PRN HHN's


Hold off steroids given clinical improvement


CTx/Azithro (D3), F/U CX's 


Monitor volumes and renal function, PRN Lasix


DVT PX: SCD (has Hep allergy)


FC


Stable from a pulmonary standpoint to D/C home





Subjective


Allergies:  


Coded Allergies:  


     HEPARIN (Verified  Allergy, Unknown, 11/29/18)


Uncoded Allergies:  


     CHICKEN (Allergy, Unknown, 11/29/18)


Subjective


AFVSS, stable on RA 


No sig SOB, minimal cough, no wheezing, edema has essentially resolved, no F/C, 

no CP


Feels he is almost back to his baseline, eager to go home





Objective





Last 24 Hour Vital Signs








  Date Time  Temp Pulse Resp B/P (MAP) Pulse Ox O2 Delivery O2 Flow Rate FiO2


 


12/1/18 09:00      Room Air  





      Room Air  


 


12/1/18 08:05      Room Air  21


 


12/1/18 08:05  81 16   Room Air  21


 


12/1/18 08:05      Room Air  21


 


12/1/18 08:00 98.6 72 18 106/65 (79) 97   





  72      


 


12/1/18 08:00  73      


 


12/1/18 04:00  71      


 


12/1/18 04:00 97.5 72 20 94/55 (68) 100   





  72      


 


12/1/18 00:00 97.3 71 20 96/54 (68) 93   





  71      


 


12/1/18 00:00  73      


 


11/30/18 23:50      Room Air  21


 


11/30/18 23:50      Room Air  21


 


11/30/18 21:00      Room Air  





      Room Air  


 


11/30/18 20:00 97.0 80 20 106/46 (66) 95   





  80      


 


11/30/18 20:00  72      


 


11/30/18 19:50      Room Air  21


 


11/30/18 19:50        21


 


11/30/18 19:50      Room Air  21


 


11/30/18 19:50        21


 


11/30/18 19:50  77 16   Room Air  21


 


11/30/18 16:29 98.0 73 20 106/64 (78) 95   





  73      


 


11/30/18 16:00  71      


 


11/30/18 12:00  70      


 


11/30/18 12:00 98.3 82 20 112/21 (51) 99   





  82      

















Intake and Output  


 


 11/30/18 12/1/18





 19:00 07:00


 


Intake Total 800 ml 


 


Balance 800 ml 


 


  


 


Intake Oral 800 ml 


 


# Voids 3 








General Appearance:  WD/WN, no acute distress


HEENT:  normocephalic, atraumatic, anicteric, mucous membranes moist


Respiratory/Chest:  chest wall non-tender, lungs clear, normal breath sounds, 

no respiratory distress, no accessory muscle use


Cardiovascular:  normal peripheral pulses, normal rate, regular rhythm


Abdomen:  normal bowel sounds, soft, non tender, no organomegaly, non distended

, no mass


Extremities:  no cyanosis, no clubbing, no edema





Microbiology








 Date/Time


Source Procedure


Growth Status


 


 


 11/29/18 12:30


Blood Blood Culture - Preliminary


NO GROWTH AFTER 24 HOURS Resulted


 


 11/29/18 12:24


Blood Blood Culture - Preliminary


NO GROWTH AFTER 24 HOURS Resulted


 


 11/29/18 13:35


Nasal Nares Influenza Types A,B Antigen (DEE) - Final Complete











Current Medications








 Medications


  (Trade)  Dose


 Ordered  Sig/Vandana


 Route


 PRN Reason  Start Time


 Stop Time Status Last Admin


Dose Admin


 


 Acetaminophen


  (Tylenol)  650 mg  Q6H  PRN


 ORAL


 Mild Pain/Temp > 100.5  11/29/18 18:00


 12/29/18 17:59   


 


 


 Albuterol/


 Ipratropium


  (Albuterol/


 Ipratropium)  3 ml  Q4H  PRN


 HHN


 Shortness of Breath  11/29/18 18:00


 12/4/18 17:59   


 


 


 Albuterol/


 Ipratropium


  (Albuterol/


 Ipratropium)  3 ml  Q6HRT


 HHN


   11/30/18 19:00


 12/5/18 18:59   


 


 


 Azithromycin 500


 mg/Dextrose  275 ml @ 


 275 mls/hr  Q24HRS


 IV


   11/29/18 20:00


 12/5/18 20:59  11/30/18 20:00


 


 


 Ceftriaxone


 Sodium 1 gm/


 Dextrose  55 ml @ 


 110 mls/hr  Q24H


 IVPB


   11/29/18 19:30


 12/6/18 19:29  11/30/18 19:30


 


 


 Guaifenesin


  (Mucinex ER)  600 mg  TWICE A  DAY


 ORAL


   11/30/18 18:00


 12/30/18 17:59  12/1/18 09:01


 


 


 Guaifenesin


  (Robitussin)  100 mg  Q4H  PRN


 ORAL


 For Cough  11/30/18 16:00


 12/30/18 15:59   


 


 


 Salmeterol


 Xinafoate/


 Fluticasone


  (Advair 250/50


 Diskus)  1 puffs  BID


 INH


   11/30/18 18:00


 12/30/18 17:59  12/1/18 09:01


 

















Darshan Salomon MD Dec 1, 2018 10:42

## 2018-12-01 NOTE — UROLOGY PROGRESS NOTE
Assessment/Plan


Assessment/Plan


1. Benign prostatic hypertrophy history.


2. Mild lower urinary tract symptoms.


3. Hematuria.


4. History of nephrolithiasis.


5. Mild chronic kidney disease.





monitor clinically


consider flomax


monitor renal fxn


consider renal u/s


outpt cysto


d/w pt fully





Subjective


Allergies:  


Coded Allergies:  


     HEPARIN (Verified  Allergy, Unknown, 11/29/18)


Uncoded Allergies:  


     CHICKEN (Allergy, Unknown, 11/29/18)


Subjective


all noted





Objective





Last 24 Hour Vital Signs








  Date Time  Temp Pulse Resp B/P (MAP) Pulse Ox O2 Delivery O2 Flow Rate FiO2


 


12/1/18 04:00  71      


 


12/1/18 04:00 97.5 72 20 94/55 (68) 100   





  72      


 


12/1/18 00:00 97.3 71 20 96/54 (68) 93   





  71      


 


12/1/18 00:00  73      


 


11/30/18 23:50      Room Air  21


 


11/30/18 23:50      Room Air  21


 


11/30/18 21:00      Room Air  





      Room Air  


 


11/30/18 20:00 97.0 80 20 106/46 (66) 95   





  80      


 


11/30/18 20:00  72      


 


11/30/18 19:50      Room Air  21


 


11/30/18 19:50        21


 


11/30/18 19:50      Room Air  21


 


11/30/18 19:50        21


 


11/30/18 19:50  77 16   Room Air  21


 


11/30/18 16:29 98.0 73 20 106/64 (78) 95   





  73      


 


11/30/18 16:00  71      


 


11/30/18 12:00  70      


 


11/30/18 12:00 98.3 82 20 112/21 (51) 99   





  82      

















Intake and Output  


 


 11/30/18 12/1/18





 19:00 07:00


 


Intake Total 800 ml 


 


Balance 800 ml 


 


  


 


Intake Oral 800 ml 


 


# Voids 3 











Microbiology








 Date/Time


Source Procedure


Growth Status


 


 


 11/29/18 12:30


Blood Blood Culture - Preliminary


NO GROWTH AFTER 24 HOURS Resulted


 


 11/29/18 13:35


Nasal Nares Influenza Types A,B Antigen (DEE) - Final Complete








Current Medications








 Medications


  (Trade)  Dose


 Ordered  Sig/Vandana


 Route


 PRN Reason  Start Time


 Stop Time Status Last Admin


Dose Admin


 


 Acetaminophen


  (Tylenol)  650 mg  Q6H  PRN


 ORAL


 Mild Pain/Temp > 100.5  11/29/18 18:00


 12/29/18 17:59   


 


 


 Albuterol/


 Ipratropium


  (Albuterol/


 Ipratropium)  3 ml  Q4H  PRN


 HHN


 Shortness of Breath  11/29/18 18:00


 12/4/18 17:59   


 


 


 Albuterol/


 Ipratropium


  (Albuterol/


 Ipratropium)  3 ml  Q6HRT


 HHN


   11/30/18 19:00


 12/5/18 18:59   


 


 


 Azithromycin 500


 mg/Dextrose  275 ml @ 


 275 mls/hr  Q24HRS


 IV


   11/29/18 20:00


 12/5/18 20:59  11/30/18 20:00


 


 


 Ceftriaxone


 Sodium 1 gm/


 Dextrose  55 ml @ 


 110 mls/hr  Q24H


 IVPB


   11/29/18 19:30


 12/6/18 19:29  11/30/18 19:30


 


 


 Guaifenesin


  (Mucinex ER)  600 mg  TWICE A  DAY


 ORAL


   11/30/18 18:00


 12/30/18 17:59  11/30/18 17:11


 


 


 Guaifenesin


  (Robitussin)  100 mg  Q4H  PRN


 ORAL


 For Cough  11/30/18 16:00


 12/30/18 15:59   


 


 


 Salmeterol


 Xinafoate/


 Fluticasone


  (Advair 250/50


 Diskus)  1 puffs  BID


 INH


   11/30/18 18:00


 12/30/18 17:59   


 








Height (Feet):  6


Height (Inches):  1.00


Weight (Pounds):  211


Objective


 exam stable











Bryon Banerjee MD Dec 1, 2018 09:02

## 2018-12-01 NOTE — CONSULTATION
DATE OF CONSULTATION:  11/30/2018

CARDIOLOGY CONSULTATION



CONSULTING PHYSICIAN:  Yong Jarquin M.D.



REFERRING PHYSICIAN:  Waldo Sanders M.D.



REASON FOR REFERRAL:  Shortness of breath.



HISTORY OF PRESENT ILLNESS:  This is an elderly gentleman, who is known to

me from prior evaluation and multiple hospitalizations at [facility].  Anyway, he has multiple medical problems admitted to the

hospital because of shortness of breath.  Apparently, he has been doing

very well for number of months, but started having some coughing and

subsequently developed some leg edema, went to the emergency room at

[facility], was packed and eventually came to the emergency room here at

[facility].  He has been admitted to the hospital and has been

seen and evaluated and has had some studies done there.  His leg was

significantly swollen according to himself, but that has decreased since

being admitted here.  The patient does not really have any chest pain at

this time.  He does have shortness of breath.  He does have cough.  He

does have shortness of breath at times that wakes him up at night, gets

some drink, and goes back to bed.  He uses one pillow.  There is no chest

pain except for what is really the coughing.  No dizziness or

lightheadedness.



PAST MEDICAL HISTORY:  Extensive and well documented in [facility] records

including history of peripheral edema; water retention; questionable

diastolic failure; history of cardiogenic syncope, status post permanent

pacemaker implantation; and generator replacement recently.  Also, has a

history of nephrolithiasis with blood calculus, status post cystoscopy;

visual bronchospasms; recanalized deep venous thrombosis in the right;

history of gastroesophageal reflux disease; diastolic heart failure;

[place] disease; adrenalectomies; systemic hypertension; hyperlipidemia;

and acute Schmorl nodes.



ALLERGIES:  He is allergic to he says heparin and chicken he says.



SOCIAL HISTORY:  He does not smoke at the present time, although previously

he had.  No drugs.  No alcohol.



REVIEW OF SYSTEMS:  GASTROINTESTINAL:  He has had no nausea, vomiting,

diarrhea, or constipation.  GENITOURINARY:  Denies.  PULMONARY:  Positive

for coughing and wheezing.  CONSTITUTIONAL:  Negative.  NEUROLOGICAL:

Negative.



PHYSICAL EXAMINATION:

GENERAL:  Shows to be elderly gentleman, in no respiratory distress, lying

in the right lateral decubitus position, does not appear in any

respiratory distress.

VITAL SIGNS:  Blood pressure anywhere between 96/57 to 106/64 and

temperature is 98 degrees.

NECK:  Supple.  No jugular venous distention.

LUNGS:  Clear except for expiratory wheezes.

CARDIAC:  Regular rate and rhythm.  No heaves, thrills, or gallops noted.

ABDOMEN:  Soft and nontender.  Positive bowel sounds.

EXTREMITIES:  There is no clubbing, cyanosis, or edema.

NEUROLOGICAL:  He is awake, alert, responsive, and in no apparent

respiratory distress.



LABORATORY DATA AND IMAGING STUDIES:  White count is 5.3, hemoglobin 13.5,

and platelet count 165,000.  Sodium is 139, potassium 4.2, chloride 106,

bicarbonate 26, BUN of 31, and creatinine 1.5.  He had a glucose of 105.

His troponin was 0.09.  His ProBNP was 169.  Total cholesterol is 217, LDL

of 172, HDL of 38, and triglycerides of 157.  TSH of 1.696.  Urinalysis is

fairly unremarkable.



Chest x-ray performed in the emergency room yesterday shows left basilar

atelectasis.  No acute overlying processes.



Venous duplex study of the lower extremities have been performed showed

no evidence of deep venous thrombosis.



ASSESSMENT AND PLAN:

1. Probable upper respiratory tract infection and diagnostic

cholelithiasis.

2. History of hypertension.

3. History of hyperlipidemia.

4. Acute diastolic heart failure.



PLAN:  The patient was seen in cardiac consultation.  The patient

cardiovascularly appears to be intact.  He does have some edema, which he

has responded to diuretics.  He is being treated for the upper respiratory

tract infection.  He should respond rather quickly hopefully to be able to

get out of here soon.  No changes in medications and he is being

empirically treated with antibiotics as well.









  ______________________________________________

  Yong Jarquin M.D.





DR:  THERESE

D:  11/30/2018 19:46

T:  12/01/2018 00:48

JOB#:  [id number]

CC:

## 2018-12-04 NOTE — DISCHARGE SUMMARY
Discharge Summary


Discharge Summary


_


DATE OF ADMISSION: 11/29/2018





DATE OF DISCHARGE: 12/01/2018





REASON FOR ADMISSION: 


68 years old male with past medical history of hypertension, pacemaker, prior  

history of DVT, status post  6 months of anticoagulation history , chest pain 

with  normal workup in the past, orthostatic hypotension, Ananth disease, 

spinal stenosis,  status post surgery, renal embolism in the past, questionable 

history of pheochromocytoma, pseudogout, history of reactive airway disease, 

bronchitis presented to emergency department with complaint of chest congestion 

, rhinorrhea and fever 102 at home and chills.  


Occasional  dry cough, no nausea, no vomiting .


He initially went to [facility] but left AGAINST MEDICAL 

ADVICE 3 times . 


Patient subsequently   presented to [facility] for further evaluation and 

treatment.  


Upon evaluation in emergency room patient was afebrile, pulse oximetry was 

stable on room air.  


Laboratory workup revealed no leukocytosis ,hemoglobin 13.5 hematocrit 38.8.


BUN  19 creatinine 1.5. 


Troponin negative . EKG revealed sinus rhythm, no acute ischemic changes.  


ProBNP 169,  stable electrolytes . Lactic acid 1.9.  


Chest x-ray revealed left basilar atelectasis,  no acute process otherwise.  

Left chest pacemaker noted.  


Urinalysis revealed no evidence of UTI, +1 blood.  


Patient admitted with diagnoses of upper respiratory infection/bronchitis,  n 

status post right lower extremity venous ablation for chronic venous 

insufficiency, chronic bronchospasm with reactive airway disease, history of 

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 

therapy for 6 months,  gastroesophageal  reflux disease, congestive heart 

failure with diastolic dysfunction, pacemaker ,hypertension ,hyperlipidemia ,

[place] disease history of adrenalectomy.  L3 lesion with   Schmorl node . 





CONSULTANTS:


cardiologist Dr. Jarquin


pulmonary Dr. Salomon


nephrologist Dr. Lemus


urologist  [facility] COURSE: 


Patient admitted to telemetry floor.  


Supplemental oxygen provided as needed to keep pulse oximetry above 92%.  


Pulmonary toilet with bronchodilator provided.  


Advair inhaler was  initiated.  


Patient started on empiric antibiotic for community-acquired pneumonia 

coverage.  


Volumes  and renal parameters were closely monitored


DVT prophylaxis with sequential compression device provided( patient had a 

heparin allergy).  


Steroids were used cautiously , given history of altered mental status and 

psychosis.


Patient was treated with  Mucinex for decongestion , antitussive with  

guaifenesin provided as needed.





Cardiologist seen and evaluated patient.  


Per cardiologist patient appeared to be cardiovascularly intact.   


Patient had initially  some peripheral edema , and diuretic was given,  patient 

responded well to diuretic.  


No change in the cardiac  regimen   as per cardiologist.  


Lipid panel revealed mixed  hyperlipidemia with  total cholesterol 217, 

, triglycerides 157, and low HDL of 38.  


Patient was counseled  on low-fat low-cholesterol diet and therapeutic life 

style changes. Patient declined statin at this time .


Recommended to  repeat lipid panel in 3 months and consider starting statin if 

no improvement  in lipid   profile. 





Influenza screen test was negative.  Blood culture were negative.


Pulmonologist who treated patient previously at [facility]  and 

office, received confirmation of Corona virus infection. 


Venous Duplex bilateral lower extremity revealed no evidence of DVT.  





Nephrologist seen and evaluated patient. 


Per nephrologist patient did not appear to be in heart failure at this point.  


Patient had a history of previous  renal    colic with calcium oxalate stone.  


Nephrology did not recommend to continue  diuretic.


Renal parameters  and electrolytes were closely monitored, electrolytes 

corrected as needed , and nephrotoxins were avoided as possible.  Patient was 

recommended to avoid nonsteroid inflammatory and essentially wasn't further 

kidney damage.  


Creatinine remained at   baseline.  


Urologist seen and evaluated patient due to  history of BPH,  hematuria and 

nephrolithiasis.  


Urologist recommended closely monitor renal function, consider renal ultrasound 

as outpatient .


Patient will  benefit from outpatient cystoscopy.  


Consider Flomax . 





Patient   clinically improved. 


Off steroids given clinical improvement.  


Pain management was addressed ,and pain was controlled. 


Bowel regimen instituted .


Supportive care provided.


Patient was stable for discharge home  








FINAL DIAGNOSES: 


Coronavirus upper respiratory infection


Acute bronchitis with bronchospasm


Reactive airway disease


Prolonged post viral course in the past.


Status post right lower extremity venous ablation for chronic venous 

insufficiency


Congestive heart failure with diastolic dysfunction


History of recanalized vein thrombosis in the right lower extremity ( status 

post 6 months of anticoagulation,  no evidence of DVT currently)


Hypertension


Mixed hyperlipidemia


Pacemaker


Ananth disease


History of adrenalectomy


Previous renal colic  with calcium oxalate stone


History of nephrolithiasis


Mild chronic kidney disease


Hematuria


History of BPH


L3 lesion, Schmorl node








DISCHARGE MEDICATIONS:


See Medication Reconciliation list.





DISCHARGE INSTRUCTIONS:


Patient was discharged home  


Follow up with primary care provider in one week.





I have been assigned to dictate discharge summary for this account. I was not 

involved in the patient's management.











Martina Borrero NP Dec 4, 2018 12:18

## 2019-01-11 ENCOUNTER — HOSPITAL ENCOUNTER (EMERGENCY)
Dept: HOSPITAL 72 - EMR | Age: 69
Discharge: LEFT BEFORE DISCHARGE | End: 2019-01-11
Payer: MEDICARE

## 2019-01-11 VITALS — SYSTOLIC BLOOD PRESSURE: 128 MMHG | DIASTOLIC BLOOD PRESSURE: 67 MMHG

## 2019-01-11 VITALS — DIASTOLIC BLOOD PRESSURE: 68 MMHG | SYSTOLIC BLOOD PRESSURE: 124 MMHG

## 2019-01-11 VITALS — BODY MASS INDEX: 26.24 KG/M2 | WEIGHT: 198 LBS | HEIGHT: 73 IN

## 2019-01-11 DIAGNOSIS — I50.9: ICD-10-CM

## 2019-01-11 DIAGNOSIS — I11.0: Primary | ICD-10-CM

## 2019-01-11 DIAGNOSIS — Z95.0: ICD-10-CM

## 2019-01-11 DIAGNOSIS — J44.9: ICD-10-CM

## 2019-01-11 LAB
ADD MANUAL DIFF: NO
ALBUMIN SERPL-MCNC: 4.3 G/DL (ref 3.4–5)
ALBUMIN/GLOB SERPL: 1.3 {RATIO} (ref 1–2.7)
ALP SERPL-CCNC: 121 U/L (ref 46–116)
ALT SERPL-CCNC: 33 U/L (ref 12–78)
ANION GAP SERPL CALC-SCNC: 10 MMOL/L (ref 5–15)
AST SERPL-CCNC: 45 U/L (ref 15–37)
BASOPHILS NFR BLD AUTO: 1.6 % (ref 0–2)
BILIRUB SERPL-MCNC: 0.8 MG/DL (ref 0.2–1)
BUN SERPL-MCNC: 16 MG/DL (ref 7–18)
CALCIUM SERPL-MCNC: 9.3 MG/DL (ref 8.5–10.1)
CHLORIDE SERPL-SCNC: 104 MMOL/L (ref 98–107)
CK MB SERPL-MCNC: 2.1 NG/ML (ref 0–3.6)
CK SERPL-CCNC: 259 U/L (ref 26–308)
CO2 SERPL-SCNC: 26 MMOL/L (ref 21–32)
CREAT SERPL-MCNC: 1.2 MG/DL (ref 0.55–1.3)
EOSINOPHIL NFR BLD AUTO: 3.9 % (ref 0–3)
ERYTHROCYTE [DISTWIDTH] IN BLOOD BY AUTOMATED COUNT: 10.9 % (ref 11.6–14.8)
GLOBULIN SER-MCNC: 3.2 G/DL
HCT VFR BLD CALC: 37.8 % (ref 42–52)
HGB BLD-MCNC: 13.5 G/DL (ref 14.2–18)
LYMPHOCYTES NFR BLD AUTO: 32.9 % (ref 20–45)
MCV RBC AUTO: 95 FL (ref 80–99)
MONOCYTES NFR BLD AUTO: 7.8 % (ref 1–10)
NEUTROPHILS NFR BLD AUTO: 53.7 % (ref 45–75)
PLATELET # BLD: 148 K/UL (ref 150–450)
POTASSIUM SERPL-SCNC: 4.7 MMOL/L (ref 3.5–5.1)
RBC # BLD AUTO: 3.96 M/UL (ref 4.7–6.1)
SODIUM SERPL-SCNC: 140 MMOL/L (ref 136–145)
WBC # BLD AUTO: 4.1 K/UL (ref 4.8–10.8)

## 2019-01-11 PROCEDURE — 85025 COMPLETE CBC W/AUTO DIFF WBC: CPT

## 2019-01-11 PROCEDURE — 83880 ASSAY OF NATRIURETIC PEPTIDE: CPT

## 2019-01-11 PROCEDURE — 84484 ASSAY OF TROPONIN QUANT: CPT

## 2019-01-11 PROCEDURE — 96374 THER/PROPH/DIAG INJ IV PUSH: CPT

## 2019-01-11 PROCEDURE — 94640 AIRWAY INHALATION TREATMENT: CPT

## 2019-01-11 PROCEDURE — 36415 COLL VENOUS BLD VENIPUNCTURE: CPT

## 2019-01-11 PROCEDURE — 93005 ELECTROCARDIOGRAM TRACING: CPT

## 2019-01-11 PROCEDURE — 99284 EMERGENCY DEPT VISIT MOD MDM: CPT

## 2019-01-11 PROCEDURE — 71045 X-RAY EXAM CHEST 1 VIEW: CPT

## 2019-01-11 PROCEDURE — 94664 DEMO&/EVAL PT USE INHALER: CPT

## 2019-01-11 PROCEDURE — 80053 COMPREHEN METABOLIC PANEL: CPT

## 2019-01-11 PROCEDURE — 82550 ASSAY OF CK (CPK): CPT

## 2019-01-11 PROCEDURE — 82553 CREATINE MB FRACTION: CPT

## 2019-01-11 RX ADMIN — ALBUTEROL SULFATE SCH MG: 2.5 SOLUTION RESPIRATORY (INHALATION) at 16:55

## 2019-01-11 RX ADMIN — Medication SCH MCG: at 16:31

## 2019-01-11 RX ADMIN — ALBUTEROL SULFATE SCH MG: 2.5 SOLUTION RESPIRATORY (INHALATION) at 16:30

## 2019-01-11 RX ADMIN — Medication SCH MCG: at 16:55

## 2019-01-11 NOTE — EMERGENCY ROOM REPORT
History of Present Illness


General


Chief Complaint:  Edema


Source:  Patient, Medical Record





Present Illness


HPI


67 yo M presents to ED c/o SOB x 1 week.  History of CHF and COPD.  States he's 

had increased leg swelling.  Takes Bumex but states it is not helping.  Notes 

cough, productive with yellowish phlegm.  Denies chest pain.  States that he 

was seen at [facility] a few days ago and had venous duplex of his legs which 

were negative.  No other aggravating relieving factors.  Denies any other 

associated symptoms


Allergies:  


Coded Allergies:  


     HEPARIN (Verified  Allergy, Unknown, 11/29/18)


Uncoded Allergies:  


     CHICKEN (Allergy, Unknown, 11/29/18)





Patient History


Past Medical History:  CHF, COPD, pneumonia


Past Surgical History:  none, pacemaker


Pertinent Family History:  none


Social History:  Denies: smoking, alcohol use, drug use


Immunizations:  UTD


Reviewed Nursing Documentation:  PMH: Agreed; PSxH: Agreed





Nursing Documentation-PMH


Past Medical History:  No History, Except For


Hx Cardiac Problems:  Yes


Hx Pacemaker:  Yes


Hx Asthma:  Yes - Pneumonia


Hx COPD:  No


Hx Diabetes:  No


Hx Cancer:  No


Hx Gastrointestinal Problems:  No


Hx Dialysis:  No


Hx Neurological Problems:  No


Hx Cerebrovascular Accident:  No


Hx Seizures:  No





Review of Systems


All Other Systems:  negative except mentioned in HPI





Physical Exam





Vital Signs








  Date Time  Temp Pulse Resp B/P (MAP) Pulse Ox O2 Delivery O2 Flow Rate FiO2


 


1/11/19 15:34 98.2 91 18 126/69 95 Room Air  








Sp02 EP Interpretation:  reviewed, normal


General Appearance:  no apparent distress, alert, GCS 15, non-toxic


Head:  normocephalic, atraumatic


Eyes:  bilateral eye normal inspection, bilateral eye PERRL


ENT:  hearing grossly normal, normal pharynx, no angioedema, normal voice


Neck:  full range of motion, supple/symm/no masses


Respiratory:  chest non-tender, crackles, speaking full sentences, wheezing


Cardiovascular #1:  regular rate, rhythm, no edema


Cardiovascular #2:  2+ carotid (R), 2+ carotid (L), 2+ radial (R), 2+ radial (L)

, 2+ dorsalis pedis (R), 2+ dorsalis pedis (L)


Gastrointestinal:  normal bowel sounds, non tender, soft, non-distended, no 

guarding, no rebound


Rectal:  deferred


Genitourinary:  normal inspection, no CVA tenderness


Musculoskeletal:  back normal, gait/station normal, normal range of motion, 

swelling


Neurologic:  alert, oriented x3, responsive, motor strength/tone normal, 

sensory intact, speech normal


Psychiatric:  judgement/insight normal, memory normal, mood/affect normal, no 

suicidal/homicidal ideation


Reflexes:  3+ bicep (R), 3+ bicep (L), 3+ tricep (R), 3+ tricep (L), 3+ knee (R)

, 3+ knee (L)


Skin:  normal color, no rash, warm/dry, well hydrated


Lymphatic:  no adenopathy





Medical Decision Making


Diagnostic Impression:  


 Primary Impression:  


 AMA


 Additional Impression:  


 CHF exacerbation


 Qualified Codes:  I50.9 - Heart failure, unspecified


ER Course


Hospital Course 


68-year-old male presents ED complaining of shortness of breath, leg swelling





Differential diagnoses include: MI/unstable angina, contusion, muscle strain, 

PTX, rib fracture





Clinical course


Patient placed on stretcher. on cardiac monitor. After initial history and 

physical I ordered labs, EKG, chest x-ray, nebs, venous duplex





labs reviewed- no leukocytosis, hemoglobin/hematocrit stable, electrolytes ok, 

trop negative


EKG - sinus arryhtmia, no acute ischemic changes interpreed by me 


Chest x-ray- pulmonary congestion, ? opacity vs consolidat


Patient declined venous duplex here stating that he had a 2 days ago





Patient has difficult IV access.  States he always has trouble with diabetes.  

I asked him about placing a EJ line and he refused.  States he wants to leave





Understands the risks of leaving.  Patient has competency to make her own 

decisions.  Signed AMA form.





I spoke to PMD Dr Sanders.  Patient is a frequent flier at several hospitals.  

Often leaves AMA.  Is noncompliant with his medications





I.  I feel this is a highly complex case requiring extensive working including 

EKG/Rhythm strip, Xray/CT/US, Blood/urine lab work, repeat exams while in ED, 

and administration of strong opiates/narcotics for pain control, admission to 

hospital or close patient follow up.  





Diagnosis - CHF exacerbation, AMA





patient leaves AMA





Labs








Test


  1/11/19


16:20


 


White Blood Count


  4.1 K/UL


(4.8-10.8)


 


Red Blood Count


  3.96 M/UL


(4.70-6.10)


 


Hemoglobin


  13.5 G/DL


(14.2-18.0)


 


Hematocrit


  37.8 %


(42.0-52.0)


 


Mean Corpuscular Volume 95 FL (80-99) 


 


Mean Corpuscular Hemoglobin


  33.9 PG


(27.0-31.0)


 


Mean Corpuscular Hemoglobin


Concent 35.6 G/DL


(32.0-36.0)


 


Red Cell Distribution Width


  10.9 %


(11.6-14.8)


 


Platelet Count


  148 K/UL


(150-450)


 


Mean Platelet Volume


  7.0 FL


(6.5-10.1)


 


Neutrophils (%) (Auto)


  53.7 %


(45.0-75.0)


 


Lymphocytes (%) (Auto)


  32.9 %


(20.0-45.0)


 


Monocytes (%) (Auto)


  7.8 %


(1.0-10.0)


 


Eosinophils (%) (Auto)


  3.9 %


(0.0-3.0)


 


Basophils (%) (Auto)


  1.6 %


(0.0-2.0)


 


Sodium Level


  140 MMOL/L


(136-145)


 


Potassium Level


  4.7 MMOL/L


(3.5-5.1)


 


Chloride Level


  104 MMOL/L


()


 


Carbon Dioxide Level


  26 MMOL/L


(21-32)


 


Anion Gap


  10 mmol/L


(5-15)


 


Blood Urea Nitrogen


  16 mg/dL


(7-18)


 


Creatinine


  1.2 MG/DL


(0.55-1.30)


 


Estimat Glomerular Filtration


Rate > 60 mL/min


(>60)


 


Glucose Level


  89 MG/DL


()


 


Calcium Level


  9.3 MG/DL


(8.5-10.1)


 


Total Bilirubin


  0.8 MG/DL


(0.2-1.0)


 


Aspartate Amino Transf


(AST/SGOT) 45 U/L (15-37) 


 


 


Alanine Aminotransferase


(ALT/SGPT) 33 U/L (12-78) 


 


 


Alkaline Phosphatase


  121 U/L


()


 


Total Creatine Kinase


  259 U/L


()


 


Creatine Kinase MB


  2.1 NG/ML


(0.0-3.6)


 


Creatine Kinase MB Relative


Index 0.8 


 


 


Troponin I


  0.000 ng/mL


(0.000-0.056)


 


Pro-B-Type Natriuretic Peptide


  115 pg/mL


(0-125)


 


Total Protein


  7.5 G/DL


(6.4-8.2)


 


Albumin


  4.3 G/DL


(3.4-5.0)


 


Globulin 3.2 g/dL 


 


Albumin/Globulin Ratio 1.3 (1.0-2.7) 








EKG Diagnostic Results


Rate:  normal


Rhythm:  other - arrythmia


ST Segments:  no acute changes


ASA given to the pt in ED:  No





Rhythm Strip Diag. Results


EP Interpretation:  yes


Rhythm:  no PVC's, no ectopy





Chest X-Ray Diagnostic Results


Chest X-Ray Diagnostic Results :  


   Chest X-Ray Ordered:  Yes


   # of Views/Limited/Complete:  1 View


   Indication:  Shortness of Breath


   EP Interpretation:  Yes


   Interpretation:  no pneumothorax, other - L basilar opacity


   Impression:  Other - chf/pneumonia


   Electronically Signed by:  Electronically signed by Jared Guzman MD





Last Vital Signs








  Date Time  Temp Pulse Resp B/P (MAP) Pulse Ox O2 Delivery O2 Flow Rate FiO2


 


1/11/19 18:30 98.2 81 16 124/68 97 Room Air  








Status:  unchanged


Disposition:  AGAINST MEDICAL ADVICE


Condition:  Stable


Referrals:  


Darshan Salomon MD (PCP)











Jared Guzman MD Jan 11, 2019 23:07

## 2019-01-11 NOTE — DIAGNOSTIC IMAGING REPORT
EXAM:

  XR Chest, 1 View

 

CLINICAL HISTORY:

  SOB

 

TECHNIQUE:

  Frontal view of the chest.

 

COMPARISON:

  11/29/18

 

FINDINGS:

  Lungs: Hypoventilatory exam.  Left basilar opacity could reflect 

atelectasis, but infectious infiltrate is not excluded.

  Pleural space:  Unremarkable.  No pneumothorax.

  Heart:.  Dual-lead pacer device in a left subclavian approach.

  Mediastinum:  Unremarkable.

  Bones/joints:  Unremarkable.

 

IMPRESSION:     

 

Hypoventilatory exam.  Left basilar opacity could reflect atelectasis, 

but infectious infiltrate is not excluded.

## 2019-01-13 NOTE — CARDIOLOGY REPORT
--------------- APPROVED REPORT --------------





EKG Measurement

Heart [id number]

DE [id number]

[id number]

[id number]

[id number]





Sinus rhythm with premature atrial complexes with aberrant conduction

Left axis deviation

Prolonged QT

Abnormal ECG

## 2024-02-27 NOTE — CONSULTATION
DATE OF CONSULTATION:  11/30/2018

CONSULTING PHYSICIAN:  Bryon Banerjee M.D.



REFERRING PHYSICIAN:  Darshan Salomon M.D.



REASON FOR CONSULTATION:  Followup of BPH and renal calculus

history.



HISTORY OF PRESENT ILLNESS:  This is a pleasant 68-year-old male who is

known to me from previous evaluations.  The patient has a history of BPH,

lower urinary tract symptoms.  He has a history of nephrolithiasis.  He

was treated earlier this year.  He was admitted because of respiratory

distress and dyspnea.  Followup Urology evaluation requested.



PAST MEDICAL HISTORY:  Significant for above.  Also history of pneumonia.

He has a history of nephrolithiasis as described above.  He has a history

of lower extremity edema and coronary artery disease.



PAST SURGICAL HISTORY:  He had a lithotripsy.



CURRENT MEDICATIONS:  Reviewed.  In the hospital, he is on albuterol,

Advair, Mucinex, azithromycin, Rocephin, and Tylenol.



ALLERGIES:  No known drug allergies.



SOCIAL HISTORY:  He is a nonsmoker.



FAMILY HISTORY:  Noncontributory.



REVIEW OF SYSTEMS:  As above.



PHYSICAL EXAMINATION:

GENERAL:  Elderly male, in no acute distress.

VITAL SIGNS:  Temperature is 98, blood pressure 106/64, pulse 72, and

respirations 20.

HEENT:  Normocephalic.

NECK:  Supple.

ABDOMEN:  Soft.

BACK:  No CVA tenderness.

GENITOURINARY:  Stable.



LABORATORY DATA:  UA showed 1+ blood.  White count 5.3 and hemoglobin 13.5.

BUN is 31 and creatinine is 1.5.



DIAGNOSTIC IMAGING STUDIES:  The patient has not had any new imaging

studies during this admission.  He has lower extremity venous duplex,

which was negative.



IMPRESSION:

1. Benign prostatic hypertrophy history.

2. Mild lower urinary tract symptoms.

3. Hematuria.

4. History of nephrolithiasis.

5. Mild chronic kidney disease.



PLAN AND DISCUSSION:  The patient is to be monitored clinically.  His renal

function is being monitored.  We will consider adding Flomax if needed and

he is to continue with antibiotics as ordered.



Thank you for this consultation.









  ______________________________________________

  Bryon Banerjee M.D.





DR:  LIZZY

D:  11/30/2018 18:03

T:  11/30/2018 20:09

JOB#:  [id number]

CC: I acted within this role throughout the entirety of the procedure performed by the primary surgeon